# Patient Record
(demographics unavailable — no encounter records)

---

## 2025-03-11 NOTE — DISCUSSION/SUMMARY
[FreeTextEntry1] : Ms. Zelaya is a 56 yo woman here for follow up. I reviewed her MRI brain, and she has white matter changes. 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 EEG routine negative. plan to redo EEG 24 hours. rec to continue aspirin and statin had blood work done recently, will send me to review. patient to follow with Dr. Leo for her spine problem (worker's com). RTC 6 months. will call patient when i have EEG result. all questions answered. if symptom get worse or new symptom, she will contact me or go to the ED. I spent the time noted on the day of this patient encounter preparing for, providing and documenting the above E/M service and counseling and educate patient on differential, workup, disease course, and treatment/management. Education was provided to the patient during this encounter. All questions and concerns were answered and addressed in detail. The patient verbalized understanding and agreed to plan. Patient was advised to continue to monitor for neurologic symptoms and to notify my office or go to the nearest emergency room if there are any changes. Any orders/referrals and communications were provided as well. side effects of the above medications were discussed in detail including but not limited to applicable black box warning and teratogenicity as appropriate.  Patient was advised to bring previous records to my office.

## 2025-03-11 NOTE — PHYSICAL EXAM
[Person] : oriented to person [Place] : oriented to place [Time] : oriented to time [Fluency] : fluency intact [Comprehension] : comprehension intact [Cranial Nerves Optic (II)] : visual acuity intact bilaterally,  visual fields full to confrontation, pupils equal round and reactive to light [Cranial Nerves Oculomotor (III)] : extraocular motion intact [Cranial Nerves Trigeminal (V)] : facial sensation intact symmetrically [Cranial Nerves Facial (VII)] : face symmetrical [Cranial Nerves Vestibulocochlear (VIII)] : hearing was intact bilaterally [Cranial Nerves Glossopharyngeal (IX)] : tongue and palate midline [Cranial Nerves Accessory (XI - Cranial And Spinal)] : head turning and shoulder shrug symmetric [Cranial Nerves Hypoglossal (XII)] : there was no tongue deviation with protrusion [Motor Tone] : muscle tone was normal in all four extremities [Motor Strength] : muscle strength was normal in all four extremities [Involuntary Movements] : no involuntary movements were seen [No Muscle Atrophy] : normal bulk in all four extremities [Sensation Vibration Decrease] : vibration was intact [Proprioception] : proprioception was intact [Abnormal Walk] : normal gait [Balance] : balance was intact [2+] : Ankle jerk left 2+ [Paresis Pronator Drift Right-Sided] : no pronator drift on the right [Paresis Pronator Drift Left-Sided] : no pronator drift on the left [Romberg's Sign] : Romberg's sign was negtive [Past-pointing] : there was no past-pointing [Tremor] : no tremor present [Dysdiadochokinesia Bilaterally] : not present [Coordination - Dysmetria Impaired Finger-to-Nose Bilateral] : not present [Plantar Reflex Right Only] : normal on the right [Plantar Reflex Left Only] : normal on the left [FreeTextEntry6] : gentry at times, with best strength it was 5/5 all  [FreeTextEntry7] : She reports decrease sensation on the right about 65 % compare to the left. reports left side feel hot and right side feel cold.
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
## 2025-03-18 NOTE — HISTORY OF PRESENT ILLNESS
[de-identified] : Ms. Ebonie Zelaya is a 56 y.o F who presents for an initial consultation regarding.  Breast Hx: s/p b/l breast reduction in 2015 in the [place], she is unhappy with the results as one breast is larger than the other.  FH: Paternal grandmother and paternal cousin at 48 with breast cancer PMH: HTN, Stroke- 10/13/2023- taking atorvastatin and aspirin.  She is a currently 1 pack a day daily smoker  B/L SM/US on 5/16/2024 reveals right breast asymmetry. Right breast 7:00 retroareolar there is a sonographic hypoechoic mass. Additional imaging with right DM/US is recommended. B0 R DM/US on 5/21/2024: Previously questioned asymmetry in the medial right breast does not persist on additional imaging and no sonographic correlate is seen. 7:00 nodule is most likely to represent an area of fat necrosis. There is also a probably benign finding at 6:00. R MMG/US in 6 months is recommended. B3  R MMG/US on 2/27/2025: There is no significant change in the probably benign nodular asymmetry in the inner right breast since 5/16. There is no significant change in the probably benign finding at 6:00 and 7:00 right breast. F/U R DM/US at time of yearly imaging in 5/2025. B3

## 2025-03-18 NOTE — PHYSICAL EXAM
[Normal] : supple, no neck mass and thyroid not enlarged [Normal Neck Lymph Nodes] : normal neck lymph nodes  [Normal Supraclavicular Lymph Nodes] : normal supraclavicular lymph nodes [Normal Axillary Lymph Nodes] : normal axillary lymph nodes [Normal] : oriented to person, place and time, with appropriate affect [FreeTextEntry1] : SC present for exam  [de-identified] : Normal S1,S2. Regular rate and rhythm [de-identified] : Complete breast exam performed in supine and upright position. No palpable masses, tenderness, nipple discharge, inversion, deviation or enlarged axillary or supraclavicular lymph nodes bilaterally. [de-identified] : Clear breath sounds bilaterally with normal respiratory effort.

## 2025-03-18 NOTE — ASSESSMENT
[FreeTextEntry1] : IMP: Ebonie is a 56 y.o F who presents with BI-RADS 3 imaging. She is s/p b/l breast reduction more then 10 years ago  PLAN: B/L DM/US 5/2025 Return in one year  All medical entries were at my, Dr. Braeden Magallanes, direction. I have reviewed the chart and agree that the record accurately reflects my personal performance of the history, physical exam, assessment and plan. Our office Nurse Practitioner was present of the duration of the office visit.
[FreeTextEntry1] : IMP: Ebonie is a 56 y.o F who presents with BI-RADS 3 imaging. She is s/p b/l breast reduction more then 10 years ago  PLAN: B/L DM/US 5/2025 Return in one year  All medical entries were at my, Dr. Braeden Magallanes, direction. I have reviewed the chart and agree that the record accurately reflects my personal performance of the history, physical exam, assessment and plan. Our office Nurse Practitioner was present of the duration of the office visit.
No

## 2025-03-18 NOTE — HISTORY OF PRESENT ILLNESS
[de-identified] : Ms. Ebonie Zelaya is a 56 y.o F who presents for an initial consultation regarding.  Breast Hx: s/p b/l breast reduction in 2015 in the [place], she is unhappy with the results as one breast is larger than the other.  FH: Paternal grandmother and paternal cousin at 48 with breast cancer PMH: HTN, Stroke- 10/13/2023- taking atorvastatin and aspirin.  She is a currently 1 pack a day daily smoker  B/L SM/US on 5/16/2024 reveals right breast asymmetry. Right breast 7:00 retroareolar there is a sonographic hypoechoic mass. Additional imaging with right DM/US is recommended. B0 R DM/US on 5/21/2024: Previously questioned asymmetry in the medial right breast does not persist on additional imaging and no sonographic correlate is seen. 7:00 nodule is most likely to represent an area of fat necrosis. There is also a probably benign finding at 6:00. R MMG/US in 6 months is recommended. B3  R MMG/US on 2/27/2025: There is no significant change in the probably benign nodular asymmetry in the inner right breast since 5/16. There is no significant change in the probably benign finding at 6:00 and 7:00 right breast. F/U R DM/US at time of yearly imaging in 5/2025. B3

## 2025-03-18 NOTE — PHYSICAL EXAM
[Normal] : supple, no neck mass and thyroid not enlarged [Normal Neck Lymph Nodes] : normal neck lymph nodes  [Normal Supraclavicular Lymph Nodes] : normal supraclavicular lymph nodes [Normal Axillary Lymph Nodes] : normal axillary lymph nodes [Normal] : oriented to person, place and time, with appropriate affect [FreeTextEntry1] : SC present for exam  [de-identified] : Normal S1,S2. Regular rate and rhythm [de-identified] : Complete breast exam performed in supine and upright position. No palpable masses, tenderness, nipple discharge, inversion, deviation or enlarged axillary or supraclavicular lymph nodes bilaterally. [de-identified] : Clear breath sounds bilaterally with normal respiratory effort.

## 2025-03-19 NOTE — PHYSICAL EXAM
[Antalgic] : antalgic [Cane] : ambulates with cane [de-identified] : Examination of the lumbar spine reveals no midline tenderness palpation, step-offs, or skin lesions.  Healed lumbar incision.  Decreased range of motion with respect to flexion, extension, lateral bending, and rotation. No tenderness to palpation of the sciatic notch. No tenderness palpation of the bilateral greater trochanters. No pain with passive internal/external rotation of the hips. No instability of bilateral lower extremities.  Negative CHIO. Negative straight leg raise bilaterally. No bowstring. Negative femoral stretch.Grossly preserved strength exception of 4 out of 5 right dorsiflexion.  Some diminished sensation as well in the right L4 and L5 nerve distributions.. 1+ patellar and Achilles reflexes. Downgoing Babinski. No clonus. Intact proprioception. Palpable pulses. No skin lesion and no edema on the right and left lower extremities. [de-identified] : AP lateral lumbar x-rays reveals instrumented L4-S1 fusion posterior laterally and with interbody devices.  She has a fractured screw with S1  Review of her CT scan reveals a pseudoarthrosis at L4-5.  She also has adjacent level breakdown at L3-4.  Lumbar MRI does reveal some increased stenosis at L3-4.

## 2025-03-19 NOTE — HISTORY OF PRESENT ILLNESS
[de-identified] : Ms. CHRISTINA WASSERMAN  is a 53 year old female who presents with low back/buttock (right > left) pain since being injured at work in 2013 when working for the [employer] as a .   She presents today with increased low back pain and right leg pain.  She had a stroke in October 2023 which affected the fine motor in her hand.

## 2025-03-19 NOTE — DISCUSSION/SUMMARY
[de-identified] : We discussed further treatment options.  She has a pseudoarthrosis at L4-5 and L5-S1.  She has failed hardware.  She also has adjacent level stenosis.  She has had extensive nonsurgical treatment.  Furthermore, she has weakness of the right lower extremity with numbness.  We discussed the nature and purpose of revision L4-S1 fusion with pelvic fixation.  We have also discussed an adjacent level laminectomy at L3-4 with extension of her fusion up to that area as well.  She would like to proceed with this option.  We will request authorization from Worker's Compensation.
23

## 2025-03-26 NOTE — PLAN
[FreeTextEntry1] : 56 year old female presents to review recent breast imaging  -Reviewed recent breast imaging -cont f/u breast MD

## 2025-03-26 NOTE — HISTORY OF PRESENT ILLNESS
[FreeTextEntry1] : 56 year old female presents to discuss her recent breast imaging. Also c/o weight gain.

## 2025-03-26 NOTE — END OF VISIT
[FreeTextEntry3] : I, Ruby Boom, acted as a scribe on behalf of Dr. Sirisha Chavira D.O. on 03/26/2025.  All medical entries made by the scribe were at my, Dr. Sirisha Chavira D.O, direction and personally dictated by me on 03/26/2025. I have reviewed the chart and agree that the record accurately reflects my personal performance of the history, physical exam, assessment and plan. I have also personally directed, reviewed, and agreed with the chart.

## 2025-06-11 NOTE — HISTORY OF PRESENT ILLNESS
[de-identified] : Ms. CHRISTINA WASSERMAN  is a 53 year old female who presents with low back/buttock (right > left) pain since being injured at work in 2013 when working for the [employer] as a .   She presents today for a follow-up visit to review new imaging studies.

## 2025-06-11 NOTE — PHYSICAL EXAM
[Antalgic] : antalgic [Cane] : ambulates with cane [de-identified] : Examination of the lumbar spine reveals no midline tenderness palpation, step-offs, or skin lesions.  Healed lumbar incision.  Decreased range of motion with respect to flexion, extension, lateral bending, and rotation. No tenderness to palpation of the sciatic notch. No tenderness palpation of the bilateral greater trochanters. No pain with passive internal/external rotation of the hips. No instability of bilateral lower extremities.  Negative CHIO. Negative straight leg raise bilaterally. No bowstring. Negative femoral stretch.Grossly preserved strength exception of 4 out of 5 right dorsiflexion.  Some diminished sensation as well in the right L4 and L5 nerve distributions.. 1+ patellar and Achilles reflexes. Downgoing Babinski. No clonus. Intact proprioception. Palpable pulses. No skin lesion and no edema on the right and left lower extremities. [de-identified] : AP lateral lumbar x-rays reveals instrumented L4-S1 fusion posterior laterally and with interbody devices.  She has a fractured screw with S1  Updated CT scan appears to demonstrate a solid arthrodesis at this time.  Lumbar MRI does reveal some increased stenosis at L3-4.

## 2025-06-11 NOTE — DISCUSSION/SUMMARY
[de-identified] : We discussed further treatment options.  She has continued back and leg pain.  She has had extensive nonsurgical treatment.  We again discussed nature and purpose of extension of her fusion to the L3-4 level as a last resort.  Risks and benefits were discussed.  She would like to proceed with this option.  We will request authorization from Worker's Compensation.